# Patient Record
Sex: MALE | Race: WHITE | NOT HISPANIC OR LATINO | Employment: FULL TIME | URBAN - METROPOLITAN AREA
[De-identification: names, ages, dates, MRNs, and addresses within clinical notes are randomized per-mention and may not be internally consistent; named-entity substitution may affect disease eponyms.]

---

## 2019-08-13 ENCOUNTER — EVALUATION (OUTPATIENT)
Dept: PHYSICAL THERAPY | Facility: REHABILITATION | Age: 64
End: 2019-08-13
Payer: COMMERCIAL

## 2019-08-13 DIAGNOSIS — M25.511 ACUTE PAIN OF RIGHT SHOULDER: Primary | ICD-10-CM

## 2019-08-13 PROCEDURE — 97112 NEUROMUSCULAR REEDUCATION: CPT | Performed by: PHYSICAL THERAPIST

## 2019-08-13 PROCEDURE — 97161 PT EVAL LOW COMPLEX 20 MIN: CPT | Performed by: PHYSICAL THERAPIST

## 2019-08-13 NOTE — LETTER
2019    [facility] [address] [facility]    Patient: Rey Gale   YOB: 1955   Date of Visit: 2019     Encounter Diagnosis     ICD-10-CM    1  Acute pain of right shoulder M25 511        Dear Dr Larissa Taylor: Thank you for your recent referral of Rey Gale  Please review the attached evaluation summary from [facility] recent visit  Please verify that you agree with the plan of care by signing the attached order  If you have any questions or concerns, please do not hesitate to call  I sincerely appreciate the opportunity to share in the care of one of your patients and hope to have another opportunity to work with you in the near future  Sincerely,    Adele Cuevas, PT      Referring Provider:      I certify that I have read the below Plan of Care and certify the need for these services furnished under this plan of treatment while under my care  Merlin Chatman MD  [facility] 31: [phone]          PT Evaluation   Assessment/Plan  Subjective    Today's date: 2019  Patient name: Rey Gale  : 1955  MRN: [id number]  Referring provider: Ernesto Otero MD  Dx:   Encounter Diagnosis   Name Primary?  Acute pain of right shoulder Yes                  Subjective/Assesment/Plan  Pt  Developed right shoulder pain about 8 weeks ago after doing aggressive work with a post hold digger  He reports that the pain is improving significantly since that time but he continues to to have sharp pain with reaching over shoulder height  He reports that a previous MRI in 2016? Indicated that he has a rotator cuff tear  He had a left shoulder RTC repair about two years ago  Pain level at rest is 1/10  Reaching over head shoots his pain up to 8/10  Pt  Works as a contractors doing residential work    He continues to work but has modified his work to accommodate to the pain  He is very concerned about long term implications of working with his shoulder as he is right handed  Pt  Presents with a signs and symptoms of a healing rotator cuff tear - appears to be supraspinatus  Pt was educated about his pathology along with limitations and precautions  He was instructed in prescribed exercises  He will return in a week to review the exercises and most likely continue with same independently  Plan to re-assess status next week and proceed per presentation  Objective     Strength/Myotome Testing     Right Shoulder     Planes of Motion   Flexion: 4+   Extension: 5   Abduction: 4   Adduction: 5   External rotation at 0°:  4+   External rotation at 45°:  4   External rotation at 90°:  4   External rotation BTH: 4+   Internal rotation at 0°:  5   Internal rotation at 45°:  4+   Internal rotation at 90°:  4+   Internal rotation BTB: 4+     Isolated Muscles   Lower trapezius: 3+   Middle trapezius: 4-   Serratus anterior: 4     Additional Strength Details  Tight pec minor bilaterally      Tests     Right Shoulder   Positive empty can, Hawkin's and Neer's  Negative AC shear, anterior slide, clunk, crossover, sulcus sign, anterior slide  and bicep load test positive  STG:  Pain level no greater than 3/10  Independent with HEP    LTG:  Pt  Will be able to hammer using a 20oz head without right shoulder pain  Pt   Will be able to use a crow bar without right shoulder pain      Precautions:  none    Manuals 8/13                                                                Exercise Diary                                        Judith 1-8 2x15            TB ER Green  2x15            Shoulder abduction with ER 4#  2x15            Doorway pec stretch 20"x3                                                                                                                                 Modalities                                                      X= performed        Flowsheet Rows      Most Recent Value   PT/OT G-Codes   Current Score  49   Projected Score  [address] Zaira Jack, ANGELITO  8/13/2019,8:26 AM

## 2019-08-13 NOTE — PROGRESS NOTES
PT Evaluation   Assessment/Plan  Subjective    Today's date: 2019  Patient name: Rey Gale  : 1955  MRN: [id number]  Referring provider: Ernesto Otero MD  Dx:   Encounter Diagnosis   Name Primary?  Acute pain of right shoulder Yes                  Subjective/Assesment/Plan  Pt  Developed right shoulder pain about 8 weeks ago after doing aggressive work with a post hold digger  He reports that the pain is improving significantly since that time but he continues to to have sharp pain with reaching over shoulder height  He reports that a previous MRI in 2016? Indicated that he has a rotator cuff tear  He had a left shoulder RTC repair about two years ago  Pain level at rest is 1/10  Reaching over head shoots his pain up to 8/10  Pt  Works as a contractors doing residential work  He continues to work but has modified his work to accommodate to the pain  He is very concerned about long term implications of working with his shoulder as he is right handed  Pt  Presents with a signs and symptoms of a healing rotator cuff tear - appears to be supraspinatus  Pt was educated about his pathology along with limitations and precautions  He was instructed in prescribed exercises  He will return in a week to review the exercises and most likely continue with same independently  Plan to re-assess status next week and proceed per presentation             Objective     Strength/Myotome Testing     Right Shoulder     Planes of Motion   Flexion: 4+   Extension: 5   Abduction: 4   Adduction: 5   External rotation at 0°: 4+   External rotation at 45°: 4   External rotation at 90°: 4   External rotation BTH: 4+   Internal rotation at 0°: 5   Internal rotation at 45°: 4+   Internal rotation at 90°: 4+   Internal rotation BTB: 4+     Isolated Muscles   Lower trapezius: 3+   Middle trapezius: 4-   Serratus anterior: 4     Additional Strength Details  Tight pec minor bilaterally      Tests Right Shoulder   Positive empty can, Hawkin's and Neer's  Negative AC shear, anterior slide, clunk, crossover, sulcus sign, anterior slide  and bicep load test positive  STG:  Pain level no greater than 3/10  Independent with HEP    LTG:  Pt  Will be able to hammer using a 20oz head without right shoulder pain  Pt   Will be able to use a crow bar without right shoulder pain      Precautions:  none    Manuals 8/13                                                                Exercise Diary                                        [place] 1-8 2x15            TB ER Green  2x15            Shoulder abduction with ER 4#  2x15            Doorway pec stretch 20"x3                                                                                                                                 Modalities                                                      X= performed        Flowsheet Rows      Most Recent Value   PT/OT G-Codes   Current Score  49   Projected Score  71          Jorge Madsen, PT  8/13/2019,8:26 AM

## 2019-08-14 ENCOUNTER — TRANSCRIBE ORDERS (OUTPATIENT)
Dept: PHYSICAL THERAPY | Facility: REHABILITATION | Age: 64
End: 2019-08-14

## 2019-08-14 DIAGNOSIS — M25.511 ACUTE PAIN OF RIGHT SHOULDER: Primary | ICD-10-CM

## 2019-08-15 ENCOUNTER — APPOINTMENT (OUTPATIENT)
Dept: PHYSICAL THERAPY | Facility: REHABILITATION | Age: 64
End: 2019-08-15
Payer: COMMERCIAL

## 2019-08-15 NOTE — PROGRESS NOTES
Daily Note     Today's date: 8/15/2019  Patient name: Juan F Mayes  : 1955  MRN: [id number]  Referring provider: Josué Hernandez MD  Dx:   Encounter Diagnosis     ICD-10-CM    1  Acute pain of right shoulder M25 511                   Subjective: ***      Objective: See treatment diary below      Assessment: Tolerated treatment {Tolerated treatment :0460617548}   Patient {assessment:7497812740}      Plan: {PLAN:0584539037}         Precautions:  none    Manuals                                                                 Exercise Diary                                        [place] 1-8 2x15            TB ER Green  2x15            Shoulder abduction with ER 4#  2x15            Doorway pec stretch 20"x3                                                                                                                                 Modalities                                                      X= performed

## 2019-08-20 ENCOUNTER — APPOINTMENT (OUTPATIENT)
Dept: PHYSICAL THERAPY | Facility: REHABILITATION | Age: 64
End: 2019-08-20
Payer: COMMERCIAL